# Patient Record
Sex: MALE
[De-identification: names, ages, dates, MRNs, and addresses within clinical notes are randomized per-mention and may not be internally consistent; named-entity substitution may affect disease eponyms.]

---

## 2021-06-03 ENCOUNTER — NURSE TRIAGE (OUTPATIENT)
Dept: OTHER | Facility: CLINIC | Age: 76
End: 2021-06-03

## 2021-06-14 ENCOUNTER — NURSE TRIAGE (OUTPATIENT)
Dept: OTHER | Facility: CLINIC | Age: 76
End: 2021-06-14

## 2021-06-14 NOTE — TELEPHONE ENCOUNTER
Brief description of triage:Diagnosed in 2002 with diabetes. Had great numbers while in a diabetic program. Was on 10 mg of Jardiance but was expensive. Was able to get samples from his doctor but now that provider has retired. Got a new provider. Saw him in September. He he started a new medication-Farxiga. Farxiga-supposed to be compatible with Jardiance. He read on the Internet that his fasting BS with these medications should be between . His 30 day average is 137. Caller wanting to know if the website has accurate information. Recommend he speak with his provider on how to lower his fasting BS. Triage indicates for patient to home care. Care advice provided, patient verbalizes understanding; denies any other questions or concerns; instructed to call back for any new or worsening symptoms. This triage is a result of a call to [address] [place] [address]. Please do not respond to the triage nurse through this encounter. Any subsequent communication should be directly with the patient.     Reason for Disposition   Health Information question, no triage required and triager able to answer question    Protocols used: INFORMATION ONLY CALL - NO TRIAGE-ADULT-

## 2021-08-05 ENCOUNTER — NURSE TRIAGE (OUTPATIENT)
Dept: OTHER | Facility: CLINIC | Age: 76
End: 2021-08-05

## 2021-08-05 NOTE — TELEPHONE ENCOUNTER
Brief description of triage: increased salivation    Triage indicates for patient to see PCP within 2 weeks. Pt agreeable with plan    Care advice provided, patient verbalizes understanding; denies any other questions or concerns; instructed to call back for any new or worsening symptoms. This triage is a result of a call to [address] [place] [address]. Please do not respond to the triage nurse through this encounter. Any subsequent communication should be directly with the patient. Reason for Disposition   All other mouth symptoms (Exceptions: dry mouth from not drinking enough liquids, chapped lips)    Answer Assessment - Initial Assessment Questions  1. SYMPTOM: \"What's the main symptom you're concerned about? \" (e.g., dry mouth. chapped lips, lump)      Excessive saliva    2. ONSET: \"When did the s/s  start? \"      January    3. PAIN: \"Is there any pain? \" If so, ask: \"How bad is it? \" (Scale: 1-10; mild, moderate, severe)      Denies pain. States he did have some redness on his gums at first but not currently or since January. Just has increased saliva. 4. CAUSE: \"What do you think is causing the symptoms? \"      Unsure. 5. OTHER SYMPTOMS: \"Do you have any other symptoms? \" (e.g., fever, sore throat, toothache, swelling)      Denies toothache. States his partial fitting easily without redness or ulcers. Denies sore throat. Denies pain anywhere in mouth. Denies red gums. 6. PREGNANCY: \"Is there any chance you are pregnant? \" \"When was your last menstrual period? \"      n/a    Protocols used: [facility]